# Patient Record
Sex: MALE | Race: WHITE | NOT HISPANIC OR LATINO | Employment: FULL TIME | URBAN - NONMETROPOLITAN AREA
[De-identification: names, ages, dates, MRNs, and addresses within clinical notes are randomized per-mention and may not be internally consistent; named-entity substitution may affect disease eponyms.]

---

## 2017-04-06 ENCOUNTER — OFFICE VISIT (OUTPATIENT)
Dept: RETAIL CLINIC | Facility: CLINIC | Age: 27
End: 2017-04-06

## 2017-04-06 VITALS — OXYGEN SATURATION: 98 % | RESPIRATION RATE: 18 BRPM | TEMPERATURE: 98.5 F | WEIGHT: 315 LBS | HEART RATE: 90 BPM

## 2017-04-06 DIAGNOSIS — J01.00 ACUTE MAXILLARY SINUSITIS, RECURRENCE NOT SPECIFIED: Primary | ICD-10-CM

## 2017-04-06 PROCEDURE — 99203 OFFICE O/P NEW LOW 30 MIN: CPT | Performed by: NURSE PRACTITIONER

## 2017-04-06 RX ORDER — AMOXICILLIN AND CLAVULANATE POTASSIUM 875; 125 MG/1; MG/1
1 TABLET, FILM COATED ORAL 2 TIMES DAILY
Qty: 20 TABLET | Refills: 0 | Status: SHIPPED | OUTPATIENT
Start: 2017-04-06 | End: 2017-04-16

## 2017-04-06 RX ORDER — BROMPHENIRAMINE MALEATE, PSEUDOEPHEDRINE HYDROCHLORIDE, AND DEXTROMETHORPHAN HYDROBROMIDE 2; 30; 10 MG/5ML; MG/5ML; MG/5ML
10 SYRUP ORAL EVERY 6 HOURS PRN
Qty: 180 ML | Refills: 0 | Status: SHIPPED | OUTPATIENT
Start: 2017-04-06 | End: 2017-04-16

## 2017-04-06 NOTE — PATIENT INSTRUCTIONS
You have been diagnosed with a sinus infection.  An antibiotic has been prescribed and  Should be taken until completely gone.  Always take the antibiotic with food  Use saline nasal spray/rinse for sinus congestion.  You should be seen again if increasing fevers, fever persists, increasing sinus pain   Or worsening.

## 2017-04-06 NOTE — PROGRESS NOTES
Subjective   Jong Joy is a 27 y.o. male.   Chief Complaint   Patient presents with   • Sinusitis      History of Present Illness   Over a wk with sinus bhavesh.  Coughing some.  Throat scratchy.  Had single episode of temp 103 a few days ago (3-4) but only in 99 range since.  No nvd  Taking otc cold meds for symptoms  No ill contacts    The following portions of the patient's history were reviewed and updated as appropriate: allergies, current medications, past family history, past medical history, past social history, past surgical history and problem list.    Review of Systems   General:  Pos for fever  Neg for fatigue  Neg for illness contacts  Hent:  Pos for sinus bhavesh  Pos for sinus pain  Pos for sore throat  Neg for ear pain  Lungs; pos for coughing  Neg for sob  Neg for smoke hx  Gi:  Neg for nvd  Neg for change in appetite      Objective   No Known Allergies    Physical Exam   General:  Awake and alert  nad  Does not appear acutely ill  Hent:  tms intact  No ejection  Light reflex present  Post pharynx red  No exudate or swelling  Uvula midline  Lungs;  cta  No use of accessory muscles  No ext cyanosis  Card:  ahr with rrr  No ectopy  No jvd      Assessment/Plan   Jong was seen today for sinusitis.    Diagnoses and all orders for this visit:    Acute maxillary sinusitis, recurrence not specified  -     amoxicillin-clavulanate (AUGMENTIN) 875-125 MG per tablet; Take 1 tablet by mouth 2 (Two) Times a Day for 10 days.  -     brompheniramine-pseudoephedrine-DM 30-2-10 MG/5ML syrup; Take 10 mL by mouth Every 6 (Six) Hours As Needed for Congestion, Cough or Allergies for up to 10 days.